# Patient Record
Sex: FEMALE | Race: WHITE
[De-identification: names, ages, dates, MRNs, and addresses within clinical notes are randomized per-mention and may not be internally consistent; named-entity substitution may affect disease eponyms.]

---

## 2018-09-21 ENCOUNTER — HOSPITAL ENCOUNTER (OUTPATIENT)
Dept: HOSPITAL 56 - MW.SDS | Age: 58
Discharge: HOME | End: 2018-09-21
Attending: SURGERY
Payer: COMMERCIAL

## 2018-09-21 VITALS — SYSTOLIC BLOOD PRESSURE: 140 MMHG | DIASTOLIC BLOOD PRESSURE: 80 MMHG

## 2018-09-21 DIAGNOSIS — Z12.11: Primary | ICD-10-CM

## 2018-09-21 DIAGNOSIS — Z86.010: ICD-10-CM

## 2018-09-21 DIAGNOSIS — Z88.6: ICD-10-CM

## 2018-09-21 DIAGNOSIS — Z99.89: ICD-10-CM

## 2018-09-21 DIAGNOSIS — F32.9: ICD-10-CM

## 2018-09-21 DIAGNOSIS — K57.30: ICD-10-CM

## 2018-09-21 DIAGNOSIS — Z91.040: ICD-10-CM

## 2018-09-21 DIAGNOSIS — G43.909: ICD-10-CM

## 2018-09-21 DIAGNOSIS — Z91.048: ICD-10-CM

## 2018-09-21 DIAGNOSIS — G47.30: ICD-10-CM

## 2018-09-21 DIAGNOSIS — Z79.899: ICD-10-CM

## 2018-09-21 DIAGNOSIS — Z88.5: ICD-10-CM

## 2018-09-21 DIAGNOSIS — Z80.0: ICD-10-CM

## 2018-09-21 DIAGNOSIS — M19.90: ICD-10-CM

## 2018-09-21 PROCEDURE — 45378 DIAGNOSTIC COLONOSCOPY: CPT

## 2018-09-21 NOTE — OR
SURGEON:

Rodrigue Pierre M.D.

 

DATE OF PROCEDURE:  09/21/2018

 

OPERATION PERFORMED:

Colonoscopy.

 

ANESTHESIA:

MAC.

 

ASA CLASSIFICATION:

II.

 

PREOPERATIVE DIAGNOSIS:

Personal history of colon polyps.

 

POSTOPERATIVE DIAGNOSIS:

Severe sigmoid diverticulosis.

 

DESCRIPTION OF PROCEDURE:

The patient was taken to the endoscopy room and positioned on the endoscopy

table in the left lateral decubitus position.  Time-out was called for

appropriate identification of the patient and procedure.  Monitored anesthesia

care was provided.  The colonoscope was inserted into the rectum and advanced

with minimal difficulty to the cecum where the colonoscope was retroflexed to

visualize the ascending colon from below.  The colonoscope was then straightened

and slowly withdrawn.  The cecum, ascending colon, hepatic flexure, transverse

colon, splenic flexure, and descending colon were very well visualized.  No

tumors, polyps, diverticula, or angiodysplastic changes were noted.  There was

no evidence of inflammatory bowel disease.  Sigmoid colon demonstrates moderate

sigmoid diverticulosis.  No stricture, spasm, or bleeding was noted.  No polyps

were encountered in the sigmoid colon.  The colonoscope was then withdrawn to

the rectum and retroflexed to visualize the anal orifice from above.  Again, no

tumors or polyps were seen and there were no acute hemorrhoidal changes.  The

colonoscope was then straightened, the rectum aspirated, and the colonoscope

removed.

 

The patient tolerated the procedure well and was taken to recovery room in

stable condition.

 

 

PATRICIA BANSAL

DD:  09/21/2018 12:24:10

DT:  09/21/2018 12:37:51

Job #:  [id number]

## 2018-09-21 NOTE — PCM.PREANE
Preanesthetic Assessment





- Anesthesia/Transfusion/Family Hx


Anesthesia History: Prior Anesthesia Without Reaction


Family History of Anesthesia Reaction: No


Transfusion History: No Prior Transfusion(s)


Intubation History: Unknown





- Review of Systems


General: No Symptoms


Pulmonary: No Symptoms


Cardiovascular: No Symptoms


Gastrointestinal: No Symptoms, Other (h/o colon polyp and proctoots)


Neurological: No Symptoms


Other: Reports: None





- Physical Assessment


Height: 1.63 m


Weight: 89.811 kg


ASA Class: 2


Mental Status: Alert & Oriented x3


Dentition: Reports: Normal Dentition, Dentures


Thyro-Mental Finger Breadths: 3


Mouth Opening Finger Breadths: 3


ROM/Head Extension: Limited/Partial


Lungs: Clear to Auscultation, Normal Respiratory Effort


Cardiovascular: Regular Rate, Regular Rhythm





- Allergies


Allergies/Adverse Reactions: 


 Allergies











Allergy/AdvReac Type Severity Reaction Status Date / Time


 


latex Allergy  cold sores Verified 18 07:43





   when  





   exposed  





   around  





   oral cavity  


 


meperidine HCl [From Demerol] Allergy  Rash Verified 18 07:43


 


morphine Allergy  Rash Verified 18 07:43


 


naproxen sodium Allergy  makes my Verified 18 07:43





[From Anaprox]   skin crawl  


 


metal Allergy  "cold Uncoded 18 07:43





   sores when  





   exposure  





   around  





   oral  





   cavity"  














- Blood


Blood Available: No





- Anesthesia Plan


Pre-Op Medication Ordered: None





- Acknowledgements


Anesthesia Type Planned: MAC


Pt an Appropriate Candidate for the Planned Anesthesia: Yes


Alternatives and Risks of Anesthesia Discussed w Pt/Guardian: Yes


Pt/Guardian Understands and Agrees with Anesthesia Plan: Yes





PreAnesthesia Questionnaire


HEENT History: Reports: Other (See Below)


Other HEENT History: wears glasses


Cardiovascular History: Reports: Other (See Below) (h/o HTN and high cholesterol

, now ok without medications)


Respiratory History: Reports: Sleep Apnea


Other Respiratory History: uses  CPAP


Gastrointestinal History: Reports: Colon Polyp, Diverticulosis


Genitourinary History: Reports: None


OB/GYN History: Reports: Pregnancy


Musculoskeletal History: Reports: Fracture, Osteoarthritis (hands,feet,spine)


Other Musculoskeletal History: hx fx arm


Neurological History: Reports: Migraines


Psychiatric History: Reports: Depression


Endocrine/Metabolic History: Reports: Obesity/BMI 30+


Hematologic History: Reports: Other (See Below)


Other Hematologic History: factor V leiden lutation





- Past Surgical History


Head Surgeries/Procedures: Reports: None


GI Surgical History: Reports: Appendectomy, Bariatric Procedure, Colonoscopy ('

15 hyperplastic polyp)


Other GI Surgeries/Procedures: gastric sleeve


Female  Surgical History: Reports:  Section, D&C, Oophorectomy (done 

at time of appendectomy), Tubal Ligation


Neurological Surgical History: Reports: C-Spine


Other Neurological Surgeries/Procedures: hx neck surgery (arthrodeis)


Musculoskeletal Surgical History: Reports: Other (See Below)


Other Musculoskeletal Surgeries/Procedures:: hx FB removed from rt index finger





- SUBSTANCE USE


Smoking Status *Q: Never Smoker


Recreational Drug Use History: No





- HOME MEDS


Home Medications: 


 Home Meds





Biotin 1,000 mcg PO DAILY 07/16/15 [History]


Docusate Sodium [Stool Softener] 100 mg PO DAILY PRN 07/16/15 [History]


Multivitamin [Multivitamins] 1 tab PO DAILY 07/16/15 [History]


Venlafaxine [Effexor XR] 150 mg PO DAILY 07/16/15 [History]


Estradiol [Yuvafem] 1 tab VAG ASDIRECTED 18 [History]











- CURRENT (IN HOUSE) MEDS


Current Meds: 





 Current Medications





Lactated Ringer's (Ringers, Lactated)  1,000 mls @ 125 mls/hr IV ASDIRECTED BRITTA





Discontinued Medications





Fentanyl (Sublimaze) Confirm Administered Dose 100 mcg .ROUTE .STK-MED ONE


   Stop: 18 08:38


Lidocaine HCl (Xylocaine-Mpf 1%) Confirm Administered Dose 5 mls @ as directed 

.ROUTE .STK-MED ONE


   Stop: 18 08:39


Midazolam HCl (Versed 1 Mg/Ml) Confirm Administered Dose 2 mg .ROUTE .STK-MED 

ONE


   Stop: 18 08:38


Propofol (Diprivan  20 Ml) Confirm Administered Dose 200 mg .ROUTE .STK-MED ONE


   Stop: 18 08:38

## 2018-09-21 NOTE — PCM.OPNOTE
- General Post-Op/Procedure Note


Date of Surgery/Procedure: 09/21/18


Operative Procedure(s): Colonoscopy


Pre Op Diagnosis: Personal history of colon polyps


Post-Op Diagnosis: Sigmoid diverticulosis


Anesthesia Technique: MAC (ASA II)


Primary Surgeon: Rodrigue Pierre


Condition: Good


Free Text/Narrative:: 


 Intake & Output











 09/21/18 09/21/18 09/21/18





 03:59 11:59 19:59


 


Intake Total  1100 


 


Balance  1100 








DICTATION 226809





CPT CODE 43829